# Patient Record
(demographics unavailable — no encounter records)

---

## 2024-11-11 NOTE — HISTORY OF PRESENT ILLNESS
[de-identified] : 83-year-old female seen in follow-up accompanied by daughter and  continues to experience chronic intermittent painLeft hip prescription.  In the groin provoked by weightbearing reports symptom improvement sponsor physical therapy and would like to renew physical therapy

## 2024-11-11 NOTE — PHYSICAL EXAM
[de-identified] : Constitutional:Well nourished , well developed and in no acute distress Psychiatric: Alert and oriented to time place and person.Appropriate affect  Skin:Head, neck, arms and lower extremities:no lesions or discoloration HEENT: Normocephalic, EOM intact, Nasal septum midline, Respiratory: Unlabored respirations,no audible wheezing ,no tachypnea, no cyanosis Cardiovascular: no leg swelling  no ankle edema no JVD, pulse regular Vascular: no calf or thigh tenderness,  Peripheral pulses; intact Lymphatics:No groin adenopathy,no lymphedema lower  or upper extremities Left hip positive logroll provocation typical pain [de-identified] : Review priorX-ray AP pelvis Left hip AP lateral degenerative narrowing subchondral sclerosis marginal osteophytes